# Patient Record
Sex: FEMALE | Race: WHITE | Employment: STUDENT | URBAN - METROPOLITAN AREA
[De-identification: names, ages, dates, MRNs, and addresses within clinical notes are randomized per-mention and may not be internally consistent; named-entity substitution may affect disease eponyms.]

---

## 2018-11-05 ENCOUNTER — HOSPITAL ENCOUNTER (EMERGENCY)
Facility: CLINIC | Age: 23
Discharge: HOME | End: 2018-11-05
Attending: NURSE PRACTITIONER | Admitting: NURSE PRACTITIONER
Payer: COMMERCIAL

## 2018-11-05 ENCOUNTER — APPOINTMENT (OUTPATIENT)
Dept: ULTRASOUND IMAGING | Facility: CLINIC | Age: 23
End: 2018-11-05
Attending: NURSE PRACTITIONER
Payer: COMMERCIAL

## 2018-11-05 VITALS
BODY MASS INDEX: 22.73 KG/M2 | OXYGEN SATURATION: 100 % | HEIGHT: 68 IN | RESPIRATION RATE: 16 BRPM | TEMPERATURE: 98.2 F | SYSTOLIC BLOOD PRESSURE: 133 MMHG | WEIGHT: 150 LBS | DIASTOLIC BLOOD PRESSURE: 83 MMHG

## 2018-11-05 DIAGNOSIS — N93.9 VAGINAL SPOTTING: ICD-10-CM

## 2018-11-05 DIAGNOSIS — B96.89 BV (BACTERIAL VAGINOSIS): ICD-10-CM

## 2018-11-05 DIAGNOSIS — N76.0 BV (BACTERIAL VAGINOSIS): ICD-10-CM

## 2018-11-05 DIAGNOSIS — Z3A.01 LESS THAN 8 WEEKS GESTATION OF PREGNANCY: ICD-10-CM

## 2018-11-05 LAB
ABO + RH BLD: NORMAL
ABO + RH BLD: NORMAL
B-HCG SERPL-ACNC: 60 IU/L (ref 0–5)
SPECIMEN EXP DATE BLD: NORMAL
SPECIMEN SOURCE: ABNORMAL
WET PREP SPEC: ABNORMAL

## 2018-11-05 PROCEDURE — 87210 SMEAR WET MOUNT SALINE/INK: CPT | Performed by: NURSE PRACTITIONER

## 2018-11-05 PROCEDURE — 84702 CHORIONIC GONADOTROPIN TEST: CPT | Performed by: NURSE PRACTITIONER

## 2018-11-05 PROCEDURE — 76801 OB US < 14 WKS SINGLE FETUS: CPT

## 2018-11-05 PROCEDURE — 99284 EMERGENCY DEPT VISIT MOD MDM: CPT | Mod: 25

## 2018-11-05 PROCEDURE — 86901 BLOOD TYPING SEROLOGIC RH(D): CPT | Performed by: NURSE PRACTITIONER

## 2018-11-05 PROCEDURE — 87591 N.GONORRHOEAE DNA AMP PROB: CPT | Performed by: NURSE PRACTITIONER

## 2018-11-05 PROCEDURE — 87491 CHLMYD TRACH DNA AMP PROBE: CPT | Performed by: NURSE PRACTITIONER

## 2018-11-05 RX ORDER — METRONIDAZOLE 7.5 MG/G
GEL VAGINAL
Qty: 70 G | Refills: 0 | Status: SHIPPED | OUTPATIENT
Start: 2018-11-05 | End: 2018-11-10

## 2018-11-05 ASSESSMENT — ENCOUNTER SYMPTOMS
CONSTIPATION: 1
FATIGUE: 1
ABDOMINAL PAIN: 1
FEVER: 0
VOMITING: 0
NAUSEA: 0
APPETITE CHANGE: 1

## 2018-11-05 NOTE — DISCHARGE INSTRUCTIONS
You need to be rechecked in clinic in 2 days to have your quantitative Hcg rechecked in 48 hours, your lab was drawn at 3pm today.     If you develop pain, vaginal bleeding that soaks more than one pad an hour or feel faint return to the ER.         Bacterial Vaginosis    You have a vaginal infection called bacterial vaginosis (BV). Both good and bad bacteria are present in a healthy vagina. BV occurs when these bacteria get out of balance. The number of bad bacteria increase. And the number of good bacteria decrease. Although BV is associated with sexual activity, it is not a sexually transmitted disease.  BV may or may not cause symptoms. If symptoms do occur, they can include:    Thin, gray, milky-white, or sometimes green discharge    Unpleasant odor or  fishy  smell    Itching, burning, or pain in or around the vagina  It is not known what causes BV, but certain factors can make the problem more likely. This can include:    Douching    Having sex with a new partner    Having sex with more than one partner  BV will sometimes go away on its own. But treatment is usually recommended. This is because untreated BV can increase the risk of more serious health problems such as:    Pelvic inflammatory disease (PID)     delivery (giving birth to a baby early if you re pregnant)    HIV and certain other sexually transmitted diseases (STDs)    Infection after surgery on the reproductive organs  Home care  General care    BV is most often treated with medicines called antibiotics. These may be given as pills or as a vaginal cream. If antibiotics are prescribed, be sure to use them exactly as directed. Also, be sure to complete all of the medicine, even if your symptoms go away.    Don't douche or having sex during treatment.    If you have sex with a female partner, ask your healthcare provider if she should also be treated.  Prevention    Don't douche.    Don't have sex. If you do have sex, then take steps to lower  your risk:  ? Use condoms when having sex.  ? Limit the number of sexual partners you have.  Follow-up care  Follow up with your healthcare provider, or as advised.  When to seek medical advice  Call your healthcare provider right away if:    You have a fever of 100.4 F (38 C) or higher, or as directed by your provider.    Your symptoms worsen, or they don t go away within a few days of starting treatment.    You have new pain in the lower belly or pelvic region.    You have side effects that bother you or a reaction to the pills or cream you re prescribed.    You or any partners you have sex with have new symptoms, such as a rash, joint pain, or sores.  Date Last Reviewed: 10/1/2017    4073-2241 [employer]. [address] [place] [address], [place] [address]. All rights reserved. This information is not intended as a substitute for professional medical care. Always follow your healthcare professional's instructions.

## 2018-11-05 NOTE — ED PROVIDER NOTES
"  History     Chief Complaint:  Vaginal Bleeding     HPI   Flor Hart is a A1 23 year old female who presents to the ED for evaluation of vaginal bleeding. The patient reports she had a positive pregnancy test a week ago. Her last menstruation was on 18. She has been feeling fatigued with breast swelling and pain. She also has an increased appetite with constipation. The patient notes she noticed mild brown spotting on her undergarment today. There was as well mild light blood with wiping. She also has lower abdominal cramping. The patient reports she had intercourse on 10/19 or 10/20 and again this weekend. The patient denies any fever, nausea, vomiting, or other symptoms. Of note, the patient reports she had an elected  at 9 weeks with her first pregnancy 6 years ago.     Allergies:  No known drug allergies    Medications:    Allegra  Atarax  Melatonin  Multivitamin  Effexor     Past Medical History:    Anorexia  Depression   PTSD    Past Surgical History:    Tonsillectomy     Family History:    History reviewed. No pertinent family history.     Social History:  Smoking status: Never smoker    Alcohol use: Occasionally   Presents to ED alone    Marital Status:  Single [1]     Review of Systems   Constitutional: Positive for appetite change and fatigue. Negative for fever.   Gastrointestinal: Positive for abdominal pain and constipation. Negative for nausea and vomiting.   Genitourinary: Positive for vaginal bleeding.   All other systems reviewed and are negative.    Physical Exam     Patient Vitals for the past 24 hrs:   BP Temp Temp src Heart Rate Resp SpO2 Height Weight   18 1410 133/83 98.2  F (36.8  C) Oral 95 16 100 % 1.727 m (5' 8\") 68 kg (150 lb)     Physical Exam  Physical Exam   Constitutional: Pt appears well-developed and well-nourished. Non toxic appearing.   Head: Head moves freely with normal range of motion.   ENT: Oropharynx is clear and moist.   Eyes: Conjunctivae pink. " EOMs intact. No scleral icterus.   Neck: Normal range of motion.    Cardiovascular: Regular rate and rhythm. Normal heart sounds. No concerning murmur.  Pulmonary/Chest: No respiratory distress. No decreased breath sounds. No wheezes. No rhonchi. No rales.   Abdominal: Soft. Non-tender. No rebound, no guarding. No CVA tenderness. No pain over McBurney's point. Negative Szymanski's sign.   Pelvic: Vagina with normal physiologic discharge with no odor, no blood in the vaginal vault, no bleeding from the cervix which is closed and long. Transition zone noted at the cervix. Uterus is midline and small. No cervical motion tenderness on exam. Non-tender uterus or adnexa on exam.   Musculoskeletal: No peripheral edema. Distal capillary refill and sensation intact.  Neurological: Oriented to person, place, and time. No focal deficits.   Skin: Skin is warm and normal in color. No rash noted.      Emergency Department Course     Imaging:  Radiographic findings were communicated with the patient who voiced understanding of the findings.    OB US 1st Trimester w Transvaginal  IMPRESSION: No evidence of intrauterine or ectopic pregnancy based on  current ultrasound. Follow-up beta-hCG and ultrasound as needed for  further assessment. As read by Radiology.     Laboratory:  Rh type: O positive     HCG pregnancy blood: 60(H)    Wet prep: Few clue cells seen, o/w Negative     Gonorrhoea PCR: In process   Chlamydia PCR: In process    Emergency Department Course:  Past medical records, nursing notes, and vitals reviewed.  1443: I performed an exam of the patient and obtained history, as documented above.    IV inserted and blood drawn.    1527: The patient had a pelvic exam performed here in the emergency department, which she tolerated without difficulty. This was done in the presence of a female chaperone.    The patient was sent for a 1st trimester OB with transvaginal ultrasound while in the emergency department, findings  above.    1645: I rechecked the patient. Explained findings to patient.    Findings and plan explained to the Patient. Patient discharged home with instructions regarding supportive care, medications, and reasons to return. The importance of close follow-up was reviewed.     Impression & Plan      Medical Decision Making:  This patient presents to the ED with vaginal spotting during pregnancy. Per LMP she is 5 1/2 weeks gestation. She denies infertility treatment. The differential diagnosis included but was not limited to spontaneous , ectopic pregnancy, threatened spontaneous , vaginal bleeding during pregnancy. Her Rh is positive, no need for Rhogam. Exam with no vaginal bleeding found. US reveals no evidence for IUP or ectopic pregnancy. Quant Hcg is 60. I suspect this is early gestation pregnancy and the spotting could be from recent vaginal intercourse versus implantation spotting. Her wet prep did show clue cells and we discussed BV treatment with metrogel. I reviewed bleeding and ectopic precautions to return here for and need for OB follow up in 48 hours to recheck Quant Hcg. She is amenable to plan.       Diagnosis:    ICD-10-CM   1. Less than 8 weeks gestation of pregnancy Z3A.01   2. Vaginal spotting N93.9   3. BV (bacterial vaginosis) N76.0    B96.89     Disposition: Patient discharged to home     Discharge Medications:  New Prescriptions    METRONIDAZOLE (METROGEL-VAGINAL) 0.75 % VAGINAL GEL    5 g daily for five days     Rona Franks  2018    EMERGENCY DEPARTMENT    Scribe Disclosure:  IRona, am serving as a scribe at 2:43 PM on 2018 to document services personally performed by Neris Rodriguez APRN CNP based on my observations and the provider's statements to me.        Neris Rodriguez APRN CNP  18

## 2018-11-05 NOTE — ED AVS SNAPSHOT
Emergency Department    [address] [facility] [address]    Phone:  [phone]    Fax:  [phone]                                       Flor Hart   MRN: [id number]    Department:   Emergency Department   Date of Visit:  11/5/2018           Patient Information     Date Of Birth          1995        Your diagnoses for this visit were:     Less than 8 weeks gestation of pregnancy     Vaginal spotting     BV (bacterial vaginosis)        You were seen by Neris Rodriguez APRN CNP.      Follow-up Information     Follow up with [facility] In 2 days.    Why:  call tomorrow to schedule    Contact information:    [address]  [address]  [facility] [phone]        Discharge Instructions       You need to be rechecked in clinic in 2 days to have your quantitative Hcg rechecked in 48 hours, your lab was drawn at 3pm today.     If you develop pain, vaginal bleeding that soaks more than one pad an hour or feel faint return to the ER.         Bacterial Vaginosis    You have a vaginal infection called bacterial vaginosis (BV). Both good and bad bacteria are present in a healthy vagina. BV occurs when these bacteria get out of balance. The number of bad bacteria increase. And the number of good bacteria decrease. Although BV is associated with sexual activity, it is not a sexually transmitted disease.  BV may or may not cause symptoms. If symptoms do occur, they can include:    Thin, gray, milky-white, or sometimes green discharge    Unpleasant odor or  fishy  smell    Itching, burning, or pain in or around the vagina  It is not known what causes BV, but certain factors can make the problem more likely. This can include:    Douching    Having sex with a new partner    Having sex with more than one partner  BV will sometimes go away on its own. But treatment is usually recommended. This is because untreated BV can increase the risk of more serious health  problems such as:    Pelvic inflammatory disease (PID)     delivery (giving birth to a baby early if you re pregnant)    HIV and certain other sexually transmitted diseases (STDs)    Infection after surgery on the reproductive organs  Home care  General care    BV is most often treated with medicines called antibiotics. These may be given as pills or as a vaginal cream. If antibiotics are prescribed, be sure to use them exactly as directed. Also, be sure to complete all of the medicine, even if your symptoms go away.    Don't douche or having sex during treatment.    If you have sex with a female partner, ask your healthcare provider if she should also be treated.  Prevention    Don't douche.    Don't have sex. If you do have sex, then take steps to lower your risk:  ? Use condoms when having sex.  ? Limit the number of sexual partners you have.  Follow-up care  Follow up with your healthcare provider, or as advised.  When to seek medical advice  Call your healthcare provider right away if:    You have a fever of 100.4 F (38 C) or higher, or as directed by your provider.    Your symptoms worsen, or they don t go away within a few days of starting treatment.    You have new pain in the lower belly or pelvic region.    You have side effects that bother you or a reaction to the pills or cream you re prescribed.    You or any partners you have sex with have new symptoms, such as a rash, joint pain, or sores.  Date Last Reviewed: 10/1/2017    0313-9298 [employer]. [address] [place] [address]. All rights reserved. This information is not intended as a substitute for professional medical care. Always follow your healthcare professional's instructions.              24 Hour Appointment Hotline       To make an appointment at any [facility], call [phone] ([phone]). If you don't have a family doctor or clinic, we will help you find one. [facility] are conveniently  located to serve the needs of you and your family.             Review of your medicines      START taking        Dose / Directions Last dose taken    metroNIDAZOLE 0.75 % vaginal gel   Commonly known as:  METROGEL-VAGINAL   Quantity:  70 g        5 g daily for five days   Refills:  0          Our records show that you are taking the medicines listed below. If these are incorrect, please call your family doctor or clinic.        Dose / Directions Last dose taken    ALLEGRA PO   Dose:  30 mg        Take 30 mg by mouth daily   Refills:  0        hydrOXYzine 25 MG tablet   Commonly known as:  ATARAX   Dose:  25 mg   Quantity:  120 tablet        Take 1 tablet (25 mg) by mouth 4 times daily   Refills:  3        MELATONIN PO   Dose:  3 mg        Take 3 mg by mouth nightly as needed   Refills:  0        Multi-vitamin Tabs tablet   Dose:  1 tablet        Take 1 tablet by mouth   Refills:  0        venlafaxine 150 MG 24 hr capsule   Commonly known as:  EFFEXOR-XR   Dose:  150 mg        Take 150 mg by mouth daily   Refills:  0                Prescriptions were sent or printed at these locations (1 Prescription)                   Other Prescriptions                Printed at Department/Unit printer (1 of 1)         metroNIDAZOLE (METROGEL-VAGINAL) 0.75 % vaginal gel                Procedures and tests performed during your visit     Chlamydia trachomatis PCR    HCG quantitative pregnancy (blood)    Neisseria gonorrhoeae PCR    OB  US 1st trimester w transvag    Rh type    Wet prep      Orders Needing Specimen Collection     None      Pending Results     Date and Time Order Name Status Description    11/5/2018 1540 Neisseria gonorrhoeae PCR In process     11/5/2018 1540 Chlamydia trachomatis PCR In process             Pending Culture Results     Date and Time Order Name Status Description    11/5/2018 1540 Neisseria gonorrhoeae PCR In process     11/5/2018 1540 Chlamydia trachomatis PCR In process             Pending Results  Instructions     If you had any lab results that were not finalized at the time of your Discharge, you can call the ED Lab Result RN at [phone]. You will be contacted by this team for any positive Lab results or changes in treatment. The nurses are available 7 days a week from 10A to 6:30P.  You can leave a message 24 hours per day and they will return your call.        Test Results From Your Hospital Stay        11/5/2018  3:35 PM      Component Results     Component Value Ref Range & Units Status    HCG Quantitative Serum 60 (H) 0 - 5 IU/L Final         11/5/2018  3:35 PM      Component Results     Component    ABO    O    RH(D)    Pos    Specimen Expires    11/08/2018 11/5/2018  4:25 PM      Narrative     ULTRASOUND OB LESS THAN 14 WEEKS WITH TRANSVAGINAL SINGLE IMAGING   11/5/2018 4:11 PM    HISTORY: Early gestation with vaginal bleeding.     TECHNIQUE: Transabdominal and transvaginal imaging were performed.   Transvaginal imaging was performed to better evaluate the uterus and  gestational sac.    COMPARISON:  None.    FINDINGS:   There is no evidence of an intrauterine gestational sac.  Endometrial stripe is mildly thickened at 16 mm.    Right ovary: Not visualized.  Left ovary: Normal measuring 2.4 x 1.4 x 1.4 cm.  Adnexal mass: None.  Free pelvic fluid: Trace in the posterior cul-de-sac.        Impression     IMPRESSION: No evidence of intrauterine or ectopic pregnancy based on  current ultrasound. Follow-up beta-hCG and ultrasound as needed for  further assessment.    DEANGELO BERNSTEIN MD         11/5/2018  3:54 PM         11/5/2018  3:54 PM         11/5/2018  4:11 PM      Component Results     Component    Specimen Description    Vagina    Wet Prep    No Trichomonas seen    Wet Prep    No yeast seen    Wet Prep (Abnormal)    Few  Clue cells seen      Wet Prep    Moderate  PMNs seen                  Clinical Quality Measure: Blood Pressure Screening     Your blood pressure was checked while you were  "in the emergency department today. The last reading we obtained was  BP: 133/83 . Please read the guidelines below about what these numbers mean and what you should do about them.  If your systolic blood pressure (the top number) is less than 120 and your diastolic blood pressure (the bottom number) is less than 80, then your blood pressure is normal. There is nothing more that you need to do about it.  If your systolic blood pressure (the top number) is 120-139 or your diastolic blood pressure (the bottom number) is 80-89, your blood pressure may be higher than it should be. You should have your blood pressure rechecked within a year by a primary care provider.  If your systolic blood pressure (the top number) is 140 or greater or your diastolic blood pressure (the bottom number) is 90 or greater, you may have high blood pressure. High blood pressure is treatable, but if left untreated over time it can put you at risk for heart attack, stroke, or kidney failure. You should have your blood pressure rechecked by a primary care provider within the next 4 weeks.  If your provider in the emergency department today gave you specific instructions to follow-up with your doctor or provider even sooner than that, you should follow that instruction and not wait for up to 4 weeks for your follow-up visit.        Thank you for choosing [facility]       Thank you for choosing [facility] for your care. Our goal is always to provide you with excellent care. Hearing back from our patients is one way we can continue to improve our services. Please take a few minutes to complete the written survey that you may receive in the mail after you visit with us. Thank you!        [facility] Information     [employer] lets you send messages to your doctor, view your test results, renew your prescriptions, schedule appointments and more. To sign up, go to [email or web address] . Click on \"Log in\" on the left side of the screen, which will take you to " "the Welcome page. Then click on \"Sign up Now\" on the right side of the page.     You will be asked to enter the access code listed below, as well as some personal information. Please follow the directions to create your username and password.     Your access code is: [email or web address]  Expires: 2/3/2019  5:00 PM     Your access code will  in 90 days. If you need help or a new code, please call your [facility] clinic or [phone].        Care EveryWhere ID     This is your Care EveryWhere ID. This could be used by other organizations to access your [facility] medical records  [id number]        Equal Access to Services     BONNIE GUZMAN : Philip Smith, mercedes acuna, ye chino, mitesh pacheco. So [facility] [phone].    ATENCIÓN: Si habla español, tiene a de los santos disposición servicios gratuitos de asistencia lingüística. Llame al [phone].    We comply with applicable federal civil rights laws and [place] laws. We do not discriminate on the basis of race, color, national origin, age, disability, sex, sexual orientation, or gender identity.            After Visit Summary       This is your record. Keep this with you and show to your community pharmacist(s) and doctor(s) at your next visit.                  "

## 2018-11-05 NOTE — ED AVS SNAPSHOT
Emergency Department    [address] [place] [address]    Phone:  [phone]    Fax:  [phone]                                       Flor Hart   MRN: [id number]    Department:   Emergency Department   Date of Visit:  11/5/2018           After Visit Summary Signature Page     I have received my discharge instructions, and my questions have been answered. I have discussed any challenges I see with this plan with the nurse or doctor.    ..........................................................................................................................................  Patient/Patient Representative Signature      ..........................................................................................................................................  Patient Representative Print Name and Relationship to Patient    ..................................................               ................................................  Date                                   Time    ..........................................................................................................................................  Reviewed by Signature/Title    ...................................................              ..............................................  Date                                               Time          22EPIC Rev 08/18

## 2018-11-06 LAB
C TRACH DNA SPEC QL NAA+PROBE: NEGATIVE
N GONORRHOEA DNA SPEC QL NAA+PROBE: NEGATIVE
SPECIMEN SOURCE: NORMAL
SPECIMEN SOURCE: NORMAL